# Patient Record
Sex: MALE | Race: WHITE | NOT HISPANIC OR LATINO | Employment: FULL TIME | URBAN - METROPOLITAN AREA
[De-identification: names, ages, dates, MRNs, and addresses within clinical notes are randomized per-mention and may not be internally consistent; named-entity substitution may affect disease eponyms.]

---

## 2017-02-20 ENCOUNTER — ALLSCRIPTS OFFICE VISIT (OUTPATIENT)
Dept: OTHER | Facility: OTHER | Age: 47
End: 2017-02-20

## 2017-05-19 ENCOUNTER — ALLSCRIPTS OFFICE VISIT (OUTPATIENT)
Dept: OTHER | Facility: OTHER | Age: 47
End: 2017-05-19

## 2017-06-15 ENCOUNTER — ALLSCRIPTS OFFICE VISIT (OUTPATIENT)
Dept: OTHER | Facility: OTHER | Age: 47
End: 2017-06-15

## 2017-06-15 DIAGNOSIS — E66.01 MORBID (SEVERE) OBESITY DUE TO EXCESS CALORIES (HCC): ICD-10-CM

## 2017-06-15 DIAGNOSIS — R63.5 ABNORMAL WEIGHT GAIN: ICD-10-CM

## 2017-06-23 ENCOUNTER — TRANSCRIBE ORDERS (OUTPATIENT)
Dept: ADMINISTRATIVE | Age: 47
End: 2017-06-23

## 2017-06-23 ENCOUNTER — APPOINTMENT (OUTPATIENT)
Dept: LAB | Age: 47
End: 2017-06-23
Payer: COMMERCIAL

## 2017-06-23 DIAGNOSIS — E66.01 MORBID OBESITY, UNSPECIFIED OBESITY TYPE (HCC): ICD-10-CM

## 2017-06-23 DIAGNOSIS — E66.9 OBESITY, UNSPECIFIED: ICD-10-CM

## 2017-06-23 DIAGNOSIS — R63.5 ABNORMAL WEIGHT GAIN: ICD-10-CM

## 2017-06-23 DIAGNOSIS — E66.01 MORBID OBESITY, UNSPECIFIED OBESITY TYPE (HCC): Primary | ICD-10-CM

## 2017-06-23 DIAGNOSIS — E66.9 OBESITY, UNSPECIFIED: Primary | ICD-10-CM

## 2017-06-23 DIAGNOSIS — E66.01 MORBID (SEVERE) OBESITY DUE TO EXCESS CALORIES (HCC): ICD-10-CM

## 2017-06-23 LAB
ALBUMIN SERPL BCP-MCNC: 3.6 G/DL (ref 3.5–5)
ALP SERPL-CCNC: 102 U/L (ref 46–116)
ALT SERPL W P-5'-P-CCNC: 45 U/L (ref 12–78)
ANION GAP SERPL CALCULATED.3IONS-SCNC: 7 MMOL/L (ref 4–13)
AST SERPL W P-5'-P-CCNC: 28 U/L (ref 5–45)
BILIRUB DIRECT SERPL-MCNC: 0.21 MG/DL (ref 0–0.2)
BILIRUB SERPL-MCNC: 0.61 MG/DL (ref 0.2–1)
BUN SERPL-MCNC: 16 MG/DL (ref 5–25)
CALCIUM SERPL-MCNC: 9.3 MG/DL (ref 8.3–10.1)
CHLORIDE SERPL-SCNC: 106 MMOL/L (ref 100–108)
CHOLEST SERPL-MCNC: 188 MG/DL (ref 50–200)
CO2 SERPL-SCNC: 27 MMOL/L (ref 21–32)
CREAT SERPL-MCNC: 0.92 MG/DL (ref 0.6–1.3)
GFR SERPL CREATININE-BSD FRML MDRD: >60 ML/MIN/1.73SQ M
GLUCOSE P FAST SERPL-MCNC: 74 MG/DL (ref 65–99)
HDLC SERPL-MCNC: 62 MG/DL (ref 40–60)
INSULIN SERPL-ACNC: 14.4 MU/L (ref 3–25)
LDLC SERPL CALC-MCNC: 112 MG/DL (ref 0–100)
POTASSIUM SERPL-SCNC: 4.7 MMOL/L (ref 3.5–5.3)
PROT SERPL-MCNC: 7.3 G/DL (ref 6.4–8.2)
SODIUM SERPL-SCNC: 140 MMOL/L (ref 136–145)
T4 FREE SERPL-MCNC: 1.09 NG/DL (ref 0.76–1.46)
TRIGL SERPL-MCNC: 68 MG/DL
TSH SERPL DL<=0.05 MIU/L-ACNC: 4.17 UIU/ML (ref 0.36–3.74)

## 2017-06-23 PROCEDURE — 80048 BASIC METABOLIC PNL TOTAL CA: CPT

## 2017-06-23 PROCEDURE — 80076 HEPATIC FUNCTION PANEL: CPT

## 2017-06-23 PROCEDURE — 80061 LIPID PANEL: CPT

## 2017-06-23 PROCEDURE — 36415 COLL VENOUS BLD VENIPUNCTURE: CPT

## 2017-06-23 PROCEDURE — 83525 ASSAY OF INSULIN: CPT

## 2017-06-23 PROCEDURE — 84439 ASSAY OF FREE THYROXINE: CPT

## 2017-06-23 PROCEDURE — 84443 ASSAY THYROID STIM HORMONE: CPT

## 2017-06-26 ENCOUNTER — GENERIC CONVERSION - ENCOUNTER (OUTPATIENT)
Dept: OTHER | Facility: OTHER | Age: 47
End: 2017-06-26

## 2017-08-04 ENCOUNTER — OFFICE VISIT (OUTPATIENT)
Dept: LAB | Facility: HOSPITAL | Age: 47
End: 2017-08-04
Attending: INTERNAL MEDICINE
Payer: COMMERCIAL

## 2017-08-04 ENCOUNTER — TRANSCRIBE ORDERS (OUTPATIENT)
Dept: LAB | Facility: HOSPITAL | Age: 47
End: 2017-08-04

## 2017-08-04 ENCOUNTER — ALLSCRIPTS OFFICE VISIT (OUTPATIENT)
Dept: OTHER | Facility: OTHER | Age: 47
End: 2017-08-04

## 2017-08-04 DIAGNOSIS — E66.1 DRUG-INDUCED OBESITY, UNSPECIFIED OBESITY SEVERITY: ICD-10-CM

## 2017-08-04 DIAGNOSIS — R63.5 ABNORMAL WEIGHT GAIN: ICD-10-CM

## 2017-08-04 DIAGNOSIS — R63.5 ABNORMAL WEIGHT GAIN: Primary | ICD-10-CM

## 2017-08-04 PROCEDURE — 93005 ELECTROCARDIOGRAM TRACING: CPT

## 2017-08-05 LAB
ATRIAL RATE: 68 BPM
PR INTERVAL: 162 MS
QRS AXIS: 68 DEGREES
QRSD INTERVAL: 80 MS
QT INTERVAL: 400 MS
QTC INTERVAL: 425 MS
T WAVE AXIS: 131 DEGREES
VENTRICULAR RATE: 68 BPM

## 2017-08-09 ENCOUNTER — GENERIC CONVERSION - ENCOUNTER (OUTPATIENT)
Dept: OTHER | Facility: OTHER | Age: 47
End: 2017-08-09

## 2017-08-09 DIAGNOSIS — R94.31 ABNORMAL ELECTROCARDIOGRAM: ICD-10-CM

## 2017-08-10 ENCOUNTER — TRANSCRIBE ORDERS (OUTPATIENT)
Dept: ADMINISTRATIVE | Facility: HOSPITAL | Age: 47
End: 2017-08-10

## 2017-08-10 DIAGNOSIS — R94.31 ABNORMAL ELECTROCARDIOGRAM: Primary | ICD-10-CM

## 2017-08-18 ENCOUNTER — HOSPITAL ENCOUNTER (OUTPATIENT)
Dept: NON INVASIVE DIAGNOSTICS | Facility: CLINIC | Age: 47
Discharge: HOME | End: 2017-08-18
Payer: COMMERCIAL

## 2017-08-18 DIAGNOSIS — R94.31 ABNORMAL ELECTROCARDIOGRAM: ICD-10-CM

## 2017-08-18 PROCEDURE — 93306 TTE W/DOPPLER COMPLETE: CPT

## 2017-09-11 ENCOUNTER — ALLSCRIPTS OFFICE VISIT (OUTPATIENT)
Dept: OTHER | Facility: OTHER | Age: 47
End: 2017-09-11

## 2017-10-12 ENCOUNTER — ALLSCRIPTS OFFICE VISIT (OUTPATIENT)
Dept: OTHER | Facility: OTHER | Age: 47
End: 2017-10-12

## 2017-10-12 DIAGNOSIS — E03.9 HYPOTHYROIDISM: ICD-10-CM

## 2017-10-26 ENCOUNTER — GENERIC CONVERSION - ENCOUNTER (OUTPATIENT)
Dept: OTHER | Facility: OTHER | Age: 47
End: 2017-10-26

## 2017-11-18 ENCOUNTER — TRANSCRIBE ORDERS (OUTPATIENT)
Dept: ADMINISTRATIVE | Age: 47
End: 2017-11-18

## 2017-11-18 ENCOUNTER — APPOINTMENT (OUTPATIENT)
Dept: LAB | Age: 47
End: 2017-11-18
Payer: COMMERCIAL

## 2017-11-18 DIAGNOSIS — E03.9 HYPOTHYROIDISM: ICD-10-CM

## 2017-11-18 DIAGNOSIS — L40.0 PSORIASIS VULGARIS: Primary | ICD-10-CM

## 2017-11-18 DIAGNOSIS — L40.0 PSORIASIS VULGARIS: ICD-10-CM

## 2017-11-18 LAB — TSH SERPL DL<=0.05 MIU/L-ACNC: 3.32 UIU/ML (ref 0.36–3.74)

## 2017-11-18 PROCEDURE — 84443 ASSAY THYROID STIM HORMONE: CPT

## 2017-11-18 PROCEDURE — 86063 ANTISTREPTOLYSIN O SCREEN: CPT

## 2017-11-18 PROCEDURE — 36415 COLL VENOUS BLD VENIPUNCTURE: CPT

## 2017-11-20 LAB — ASO AB TITR SER LA: NORMAL {TITER}

## 2017-11-22 ENCOUNTER — ALLSCRIPTS OFFICE VISIT (OUTPATIENT)
Dept: OTHER | Facility: OTHER | Age: 47
End: 2017-11-22

## 2017-12-19 ENCOUNTER — GENERIC CONVERSION - ENCOUNTER (OUTPATIENT)
Dept: OTHER | Facility: OTHER | Age: 47
End: 2017-12-19

## 2018-01-10 NOTE — PROGRESS NOTES
Chief Complaint  Chief Complaint Free Text Note Form: [facility] follow up      History of Present Illness  HPI: The patient presents for [facility] follow up  Has been taking the Vyvanse without side effects  Has no impulse to binge while at work, so has been taking the Vyvanse as late as 4 pm, except for on the weekends, where he takes it earlier in the day  He denies trouble sleeping, but feels the effects where off by 9 30 pm  He has seen Brenda Cody, the eating disorder counselor, twice since his last visit  He is interested in starting a medication like an SSRI or Wellbutrin, for which he will f/u with his PCP to discuss  He exercises on the treadmill daily  Past Medical History    1  History of Elevated blood pressure reading (796 2) (R03 0)  Active Problems And Past Medical History Reviewed: The active problems and past medical history were reviewed and updated today  Assessment    1  Abnormal weight gain (783 1) (R63 5)   2  Obesity (278 00) (E66 9)   3  Binge eating disorder (307 50) (F50 81)   4  Subclinical hypothyroidism (244 8) (E03 9)    Plan  Binge eating disorder    1  Vyvanse 30 MG Oral Capsule; TAKE 1 CAPSULE DAILY IN THE MORNING  Subclinical hypothyroidism    2  (1) TSH WITH FT4 REFLEX; Status:Active; Requested for:12Oct2017;     Discussion/Summary  Discussion Summary:   52-year-old male with obesity  He has had successful weight loss with [employer], but had concerns of binge eating disorder  He was evaluated by behavioral health specialist, Brenda Cody and was diagnosed with binge eating disorder  At his last visit, treatment options were reviewed: Vyvanse (treat the binging) vs SSRI, which would be used to treat the trigger to the binge eating disorder  He was not interested in starting an SSRI at that time  Currently, his sx are improving with the Vyvanse, but it wears off by the end of the day  He will continue taking the Vyvanse in the early afternoon   Discussed increasing the dose to 50 milligrams, however since he is binging now only 2 times per month (as compared to 3-4x/week) he prefers to stay at the 30 milligram dose as he is doing well without side effects  At this time, hewould like to consider adding a weight neutral SSRI as he agrees this may aid in treating the root cause to the binge  He will follow up with his PCP to discuss as well as discussing transition of management of the Vyvanse with them  Initial: 259 3 lbs  Current: 233 3 lbs  Change: -26 lbs (-10 %)    Obesity Class 2:  -he has been doing well with weight loss by following [employer] program, despite his frequent travel    -Not currently interested in the weight loss programs offered by [facility]  -Pt will follow up with Pat Young for continued co-management and tx with Vyvanse  BED:  -sees Pat Young bi-weekly  Per conversation from September visit: She will continue seeing the pt, but adds the pt does lack insight to the emotions/triggers and she will continue with management on the issue  I had reviewed with her the tx options we had discussed and that pt had opted for starting the Vyvanse over the SSRI  -left message for Pat Young to return call on patient progress  At today's visit patient seemed to have more insight and expressed interest that he does not want to rely on a medication for treatment of the bingeing  He would like to trial a medication like an SSRI in lieu of increasing the Vyvanse dose  -EKG: low voltage QRS, therefore ECHO was ordered, which showed trace mitral and tricuspid regurgitation, otherwise was in acceptable limits  Will continue the Vyvanse  Script given today  Not to fill before 9/17/17     Subclinical hypothyroidism:  -Tx not indicated at this time  Repeat in 3-6 months  -TSH with reflex to T4 ordered  RTC in one month, however he is traveling in the 1st two weeks of November so will follow up at the end of November   Vyvanse prescription for November provided to the patient, with the understanding that he would be seen during the last week of November  Goals and Barriers: The patient has the current Goals: At resistance exercises 2 times per week  Minimum 64 ounces of water per day  Patient's Capacity to Self-Care: Patient is able to Self-Care  Medication SE Review and Pt Understands Tx: Possible side effects of new medications were reviewed with the patient/guardian today  The treatment plan was reviewed with the patient/guardian  The patient/guardian understands and agrees with the treatment plan   Bariatric Medicine Diagnostic Constellation: The patient was seen for a 25 minute visit  > 50 % of the time was spent counseling  PT was counseled on the multi-disciplinary approach to weight loss  Discussed appropriate use of Vyvanse and regular follow up in order for continued use of the medication  Reviewed the importance of a multidisciplinary approach to weight loss, which includes dietary, behavioral, and exercise modifications  Patient Discussion: 25 minute visit, greater than half of the time was spent on counseling  Counseling spent on review of medication options and side effect profiles  Emphasize the importance of behavioral health follow-up as well as close follow-up with our office in order for us to continue prescribing Vyvanse  Reviewed lab work with patient   Understands and agrees with treatment plan: The treatment plan was reviewed with the patient/guardian  The patient/guardian understands and agrees with the treatment plan      Active Problems    1  Abnormal EKG (794 31) (R94 31)   2  Abnormal weight gain (783 1) (R63 5)   3  Binge eating disorder (307 50) (F50 81)   4  Obesity (278 00) (E66 9)   5  Psoriasis (696 1) (L40 9)   6  Severe obesity (BMI 35 0-39 9) (278 01) (E66 01)   7  Subclinical hypothyroidism (244 8) (E03 9)    Surgical History    1  Denied: History of Recent Surgery  Surgical History Reviewed:    The surgical history was reviewed and updated today  Family History  Mother    1  Family history of psoriasis (V19 4) (Z84 0)  Father    2  Family history of hypertension (V17 49) (Z82 49)  Son    3  Family history of ADHD (attention deficit hyperactivity disorder) evaluation  Spouse    4  Family history of attention deficit disorder (V17 0) (Z81 8)  Family History Reviewed: The family history was reviewed and updated today  Social History    · Alcohol drinker (V49 89) (Z78 9)   · Always uses seat belt   · Employed in professional specialty position   ·    · Never a smoker   · No illicit drug use  Social History Reviewed: The social history was reviewed and updated today  Current Meds   1  Calcipotriene 0 005 % External Solution; APPLY SPARINGLY TO AFFECTED AREA(S)   ONCE DAILY; Therapy: 76Qwi2664 to (Last Rx:33Cne5732)  Requested for: 61Kje8079 Ordered   2  Clobetasol Propionate 0 05 % External Ointment; APPLY AND GENTLY MASSAGE INTO   AFFECTED AREA(S) TWICE DAILY; Therapy: 78Kvp7361 to (Last Rx:81Iqe2473)  Requested for: 06GZK6021 Ordered   3  Daily Multivitamin TABS; Therapy: (Recorded:15Jun2017) to Recorded   4  Vyvanse 30 MG Oral Capsule; TAKE 1 CAPSULE DAILY IN THE MORNING; Therapy: 50HBI4068 to (Evaluate:11Oct2017); Last Rx:48Nrt1291 Ordered  Medication List Reviewed: The medication list was reviewed and updated today  Allergies    1  No Known Drug Allergies    Vitals  Vital Signs    Recorded: 43HCO8886 03:34PM   Temperature 97 1 F   Heart Rate 84   Respiration 16   Systolic 937   Diastolic 78   Height 5 ft 8 5 in   Weight 233 lb 4 8 oz   BMI Calculated 34 96   BSA Calculated 2 19     Physical Exam    Constitutional   General appearance: Abnormal   well developed and obese  Eyes No conjunctival pallor  Ears, Nose, Mouth, and Throat Oral mucosa moist    Pulmonary   Respiratory effort: No increased work of breathing or signs of respiratory distress      Auscultation of lungs: Clear to auscultation, equal breath sounds bilaterally, no wheezes, no rales, no rhonci  Cardiovascular   Auscultation of heart: Normal rate and rhythm, normal S1 and S2, without murmurs  Examination of extremities for edema and/or varicosities: Normal   no edema  Abdomen   Abdomen: Abnormal   The abdomen was obese  Bowel sounds were normal  The abdomen was soft and nontender     Musculoskeletal   Gait and station: Normal     Psychiatric   Orientation to person, place and time: Normal          Future Appointments    Date/Time Provider Specialty Site   11/22/2017 08:00 AM Mikhail Martinez [facility] Bariatric Medicine [facility]     Signatures   Electronically signed by : Saintclair Savage, [facility]; Oct 12 2017  4:33PM EST                       (Author)    Electronically signed by : TU Clemens ; Oct 13 2017  8:35AM EST                       (Co-author)

## 2018-01-11 ENCOUNTER — GENERIC CONVERSION - ENCOUNTER (OUTPATIENT)
Dept: OTHER | Facility: OTHER | Age: 48
End: 2018-01-11

## 2018-01-13 VITALS
WEIGHT: 233.3 LBS | BODY MASS INDEX: 34.55 KG/M2 | TEMPERATURE: 97.1 F | RESPIRATION RATE: 16 BRPM | DIASTOLIC BLOOD PRESSURE: 78 MMHG | SYSTOLIC BLOOD PRESSURE: 124 MMHG | HEIGHT: 69 IN | HEART RATE: 84 BPM

## 2018-01-13 VITALS
WEIGHT: 298.38 LBS | BODY MASS INDEX: 45.22 KG/M2 | HEIGHT: 68 IN | SYSTOLIC BLOOD PRESSURE: 130 MMHG | RESPIRATION RATE: 18 BRPM | TEMPERATURE: 97.9 F | DIASTOLIC BLOOD PRESSURE: 80 MMHG | HEART RATE: 84 BPM

## 2018-01-13 NOTE — PROGRESS NOTES
Chief Complaint  Chief Complaint Free Text Note Form: Patient is here today for 2-3 month [facility] follow-up  Past Medical History    1  History of Elevated blood pressure reading (796 2) (R03 0)    Assessment    1  Severe obesity (BMI 35 0-39 9) (278 01) (E66 01)   2  Binge eating disorder (307 50) (F50 81)   3  Subclinical hypothyroidism (244 8) (E03 9)    Plan  Abnormal weight gain, Severe obesity (BMI 35 0-39 9)    1  ECG 12-LEAD; Status:Hold For - Scheduling; Requested for:49Gxc1739;     Discussion/Summary  Discussion Summary:   15-year-old male with severe obesity stemming since childhood  Maximum weight having been 400 pounds and successfully lost down to 180 pounds through diet and exercise modification but this was not maintained for more than 1 year and progressively regained close to 400 pound huang again  Over the past 4 years or so he has slowly come down through dietary changes and recently started [facility] but was concerned over certain eating behaviors that he has taken notice of  He is concerned that he may have a binge eating disorder and would like to be placed on Vyvnanse  He certainly has characteristics of BED and is currently seeking behavioral health help from a specialist that specializes in eating disorders (Latanya Tirado)  Obesity Class 2:  -continues to do well w/ weight loss, following a [facility] program, did not start VLCD and he is not interested in any of our other programs  -his main concern is to get treatment for his BED, I again emphasized that we are not an eating disorder clinic however since he has agreed to follow up w/ behavioral health I will be happy to co-manage this condition from a medical standpoint  Should he stop following up w/ behavioral health or our practice I will no longer manage this condition  Pt understands    BED:  -sees Latanya Tirado weekly  I spoke to her yesterday  Some of these triggers appear to be related to family stressors   I addressed the importance of tackling the root cause of this and suggested an SSRI such as celexa or lexapro may be more appropriate since there is an emotional trigger(anxiety) that causes him to binge rather than Vyvanse which only tackles the act of binging  He was quite insistent on starting vyvanse, I will order an EKG, denies CP,SOB, CAD, fam leydi of SCD  Pt reports a normal stress test in 2012  Side effect profile reviewed  Subclinical hypothyroidism:  -No indication for treatment this time  Repeat in 3-6 months  RTC in 4-6 weeks  Patient's Capacity to Self-Care: Patient is able to Self-Care  Medication SE Review and Pt Understands Tx: Possible side effects of new medications were reviewed with the patient/guardian today  The treatment plan was reviewed with the patient/guardian  The patient/guardian understands and agrees with the treatment plan   Bariatric Medicine Diagnostic Constellation:   Patient Discussion: 40 minute visit, greater than half of the time was spent on counseling  Counseling spent on review of medication options and side effect profiles  Emphasize the importance of behavioral health follow-up as well as close follow-up with our office in order for us to continue prescribing Vyvanse  Reviewed lab work with patient   Understands and agrees with treatment plan: The treatment plan was reviewed with the patient/guardian  The patient/guardian understands and agrees with the treatment plan      Active Problems    1  Abnormal weight gain (783 1) (R63 5)   2  Obesity (278 00) (E66 9)   3  Psoriasis (696 1) (L40 9)   4  Severe obesity (BMI 35 0-39 9) (278 01) (E66 01)    Surgical History    1  Denied: History of Recent Surgery    Family History  Mother    1  Family history of psoriasis (V19 4) (Z84 0)  Father    2  Family history of hypertension (V17 49) (Z82 49)  Son    3  Family history of ADHD (attention deficit hyperactivity disorder) evaluation  Spouse    4   Family history of attention deficit disorder (V17 0) (Z81 8)    Social History    · Alcohol drinker (V49 89) (Z78 9)   · Always uses seat belt   · Employed in professional specialty position   ·    · Never a smoker   · No illicit drug use    Current Meds   1  Calcipotriene 0 005 % External Solution; APPLY SPARINGLY TO AFFECTED AREA(S)   ONCE DAILY; Therapy: 19Jyw1093 to (Last Rx:03Sgk1506)  Requested for: 70Ezi0780 Ordered   2  Clobetasol Propionate 0 05 % External Ointment; APPLY AND GENTLY MASSAGE INTO   AFFECTED AREA(S) TWICE DAILY; Therapy: 91Cuf7232 to (Last Rx:19May2017)  Requested for: 81RLP1308 Ordered   3  Daily Multivitamin TABS; Therapy: (Recorded:15Jun2017) to Recorded    Allergies    1  No Known Drug Allergies    Vitals  Vital Signs    Recorded: 04Aug2017 09:30AM   Temperature 98 1 F, Tympanic   Heart Rate 86, L Radial   Pulse Quality Normal, L Radial   Systolic 531, LUE, Sitting   Diastolic 72, LUE, Sitting   Height 5 ft 8 5 in   Weight 243 lb 3 2 oz   BMI Calculated 36 44   BSA Calculated 2 23     Future Appointments    Date/Time Provider Specialty Site   09/11/2017 06:00 PM TU Catherine   [address] [place] [address]   09/11/2017 03:00 PM Kimani Hamilton [facility] Diabetes Educator [facility]     Signatures   Electronically signed by : TU Baeza ; Aug  4 2017 10:28AM EST                       (Author)

## 2018-01-14 VITALS
HEIGHT: 69 IN | HEART RATE: 78 BPM | DIASTOLIC BLOOD PRESSURE: 92 MMHG | WEIGHT: 241.9 LBS | TEMPERATURE: 97.6 F | BODY MASS INDEX: 35.83 KG/M2 | SYSTOLIC BLOOD PRESSURE: 132 MMHG

## 2018-01-14 VITALS
SYSTOLIC BLOOD PRESSURE: 132 MMHG | TEMPERATURE: 98.1 F | DIASTOLIC BLOOD PRESSURE: 72 MMHG | HEART RATE: 86 BPM | HEIGHT: 69 IN | BODY MASS INDEX: 36.02 KG/M2 | WEIGHT: 243.2 LBS

## 2018-01-14 VITALS
RESPIRATION RATE: 16 BRPM | BODY MASS INDEX: 41.75 KG/M2 | TEMPERATURE: 98.6 F | SYSTOLIC BLOOD PRESSURE: 122 MMHG | DIASTOLIC BLOOD PRESSURE: 80 MMHG | WEIGHT: 266 LBS | HEIGHT: 67 IN | HEART RATE: 72 BPM

## 2018-01-14 NOTE — PROGRESS NOTES
Chief Complaint  Chief Complaint Free Text Note Form: Patient is here today for 4-6 week [facility] follow-up  History of Present Illness  HPI: The patient presents for six-week medical weight management follow-up  Since his last visit he was started on Wellbutrin by his PCP  He is tolerating the medication well without side effects  He has had 1-2 binges in the past month, despite these episodes, he does feel like it is effective as in the past was experiencing bingeing episodes between 2-4 times per week  At times does not remain effective throughout the entire day, particularly on the weekends  At this time he would like to try increasing the dose to 50 mg as discussed at his last appointment, which is the recommended dose for BED  Reports he does feel that the Wellbutrin is helping some and feels that he does notice subtle effects throughout the entire day  He describes this as things feeling brighter  He was traveling frequently in the past month, but plans to continue seeing the eating disorder specialist, Benjamin Haro  He will follow up with his PCP next month for continued medication management  Past Medical History    1  History of Elevated blood pressure reading (796 2) (R03 0)   2  History of Severe obesity (BMI 35 0-39 9) (278 01) (E66 01)   3  History of Subclinical hypothyroidism (244 8) (E03 9)  Active Problems And Past Medical History Reviewed: The active problems and past medical history were reviewed and updated today  Assessment    1  History of Severe obesity (BMI 35 0-39 9) (278 01) (E66 01)   2  Binge eating disorder (307 50) (F50 81)   3  Class 1 obesity (278 00) (E66 9)   4  History of Subclinical hypothyroidism (244 8) (E03 9)    Plan  Binge eating disorder    1  From  Vyvanse 30 MG Oral Capsule TAKE 1 CAPSULE DAILY IN THE   MORNING To Vyvanse 50 MG Oral Capsule TAKE 1 CAPSULE DAILY IN THE MORNING    Discussion/Summary  Discussion Summary:   26-year-old male with obesity   He has had successful weight loss with [employer], but had concerns of binge eating disorder  He was evaluated by behavioral health specialist, Benjamin Haro and was diagnosed with binge eating disorder  Since his last visit, he was started on Wellbutrin, which has helped underlying anxiety  He continues to do well with Vyvanse  We will titrate him up to the 50 mg dose  Initial: 259 3 lbs  Current: 222 6 lbs  Change: -36 7 lbs (-14 %)    Obesity Class 2:  -he has been doing well with weight loss by following [employer] program, despite his frequent travel    -Not currently interested in the weight loss programs offered by [facility]  -Pt will follow up with Benjamin Haro for continued co-management and tx with Vyvanse  BED/anxiety:  -sees Benjamin Haro bi-weekly  Per conversation from September visit: She will continue seeing the pt, but adds the pt does lack insight to the emotions/triggers and she will continue with management on the issue  I had reviewed with her the tx options we had discussed and that pt had opted for starting the Vyvanse over the SSRI  -spoke with Benjamin Haro on 11/21  She plans on continuing to see Alona Kerr  He will return to see her as his work travel slow down  -EKG: low voltage QRS, therefore ECHO was ordered, which showed trace mitral and tricuspid regurgitation, otherwise was in acceptable limits  -PDMP queried, no red flags  Patient will be increased to 50 mg of the Vyvanse  He will start this on Monday  He has follow-up with his PCP scheduled for December to review medication management of Wellbutrin  If he is tolerating the dose increase of the Vyvanse at that time without any adverse effects, he will have management of BED by PCP  Subclinical hypothyroidism:  -TSH within acceptable limits from 11/18  -recommended he continue follow up with his PCP and have the TSH recheck in one year, unless he develops S/Sx of hyper or hypothyroidism as reviewed with the patient      RTC in one month to review med dose increased, unless well tolerated at his appointment with his PCP in December, in which prescription management will be taken over by them at his PCP's discretion  Patient's Capacity to Self-Care: Patient is able to Self-Care  Medication SE Review and Pt Understands Tx: Possible side effects of new medications were reviewed with the patient/guardian today  The treatment plan was reviewed with the patient/guardian  The patient/guardian understands and agrees with the treatment plan   Bariatric Medicine Diagnostic Constellation: The patient was seen for a 25 minute visit  > 50 % of the time was spent counseling  PT was counseled on the multi-disciplinary approach to weight loss  Discussed appropriate use of Vyvanse and regular follow up in order for continued use of the medication  Reviewed the importance of a multidisciplinary approach to weight loss, which includes dietary, behavioral, and exercise modifications  Understands and agrees with treatment plan: The treatment plan was reviewed with the patient/guardian  The patient/guardian understands and agrees with the treatment plan      Active Problems    1  Abnormal EKG (794 31) (R94 31)   2  Abnormal weight gain (783 1) (R63 5)   3  Binge eating disorder (307 50) (F50 81)   4  Influenza vaccine needed (V04 81) (Z23)   5  Obesity (278 00) (E66 9)   6  Psoriasis (696 1) (L40 9)    Surgical History    1  Denied: History of Recent Surgery  Surgical History Reviewed: The surgical history was reviewed and updated today  Family History  Mother    1  Family history of psoriasis (V19 4) (Z84 0)  Father    2  Family history of hypertension (V17 49) (Z82 49)  Son    3  Family history of ADHD (attention deficit hyperactivity disorder) evaluation  Spouse    4  Family history of attention deficit disorder (V17 0) (Z81 8)  Family History Reviewed: The family history was reviewed and updated today         Social History    · Alcohol drinker (V49 89) (Z78 9)   · Always uses seat belt   · Employed in professional specialty position   ·    · Never a smoker   · No illicit drug use  Social History Reviewed: The social history was reviewed and updated today  Current Meds   1  Calcipotriene 0 005 % External Solution; APPLY SPARINGLY TO AFFECTED AREA(S)   ONCE DAILY; Therapy: 03Bef9919 to (Last Rx:67Dnp9743)  Requested for: 00Lbf8311 Ordered   2  Clobetasol Propionate 0 05 % External Ointment; APPLY AND GENTLY MASSAGE INTO   AFFECTED AREA(S) TWICE DAILY; Therapy: 07Iew6195 to (Last Rx:26Oct2017)  Requested for: 26Oct2017 Ordered   3  Daily Multivitamin TABS; Therapy: (Recorded:15Jun2017) to Recorded   4  Vyvanse 30 MG Oral Capsule; TAKE 1 CAPSULE DAILY IN THE MORNING; Therapy: 93NJR5572 to (Evaluate:11Nov2017); Last Rx:12Oct2017 Ordered   5  Wellbutrin  MG Oral Tablet Extended Release 12 Hour; TAKE 1 TABLET TWICE   DAILY; Therapy: 22WBB0229 to 52-28-62-17)  Requested for: 0499 52 06 34; Last   Rx:26Oct2017 Ordered  Medication List Reviewed: The medication list was reviewed and updated today  Allergies    1  No Known Drug Allergies    Vitals  Vital Signs    Recorded: 22Nov2017 08:11AM Recorded: 22Nov2017 07:56AM   Temperature  98 4 F   Heart Rate  80   Systolic 693 783, LUE, Sitting   Diastolic 88 90, LUE, Sitting   Height  5 ft 8 5 in   Weight  222 lb 9 6 oz   BMI Calculated  33 35   BSA Calculated  2 15     Physical Exam    Constitutional   General appearance: Abnormal   well developed and obese  Eyes No conjunctival pallor  Ears, Nose, Mouth, and Throat Oral mucosa moist    Pulmonary   Respiratory effort: No increased work of breathing or signs of respiratory distress  Auscultation of lungs: Clear to auscultation, equal breath sounds bilaterally, no wheezes, no rales, no rhonci  Cardiovascular   Auscultation of heart: Normal rate and rhythm, normal S1 and S2, without murmurs      Examination of extremities for edema and/or varicosities: Normal   no edema  Abdomen   Abdomen: Abnormal   The abdomen was obese  Bowel sounds were normal  The abdomen was soft and nontender  Musculoskeletal   Gait and station: Normal     Psychiatric   Orientation to person, place and time: Normal          Results/Data  (1) TSH WITH FT4 REFLEX 16HMO9178 07:28AM Jhonatan Connors   TW Order Number: [id number]     Test Name Result Flag Reference   TSH 3 320 uIU/mL  0 358-3 740   Patients undergoing fluorescein dye angiography may retain small amounts of fluorescein in the body for 48-72 hours post procedure  Samples containing fluorescein can produce falsely depressed TSH values  If the patient had this procedure,a specimen should be resubmitted post fluorescein clearance  Future Appointments    Date/Time Provider Specialty Site   12/05/2017 03:20 PM TU Del Cid  [address] [place] [address]   12/27/2017 08:30 AM Jhonatan Connors [facility] Bariatric Medicine [facility]     Verified Results  (1) TSH WITH FT4 REFLEX 67HXO3206 07:28AM Jhonatan Connors    Order Number: [id number]   [Nov 22, 2017 8:09AM Genna Johnson]  TSH within acceptable limits  Reviewed with the pt at his appointment on 11/22/17  Recommend annual f/u with PCP     Test Name Result Flag Reference   TSH 3 320 uIU/mL  0 358-3 740   Patients undergoing fluorescein dye angiography may retain small amounts of fluorescein in the body for 48-72 hours post procedure  Samples containing fluorescein can produce falsely depressed TSH values  If the patient had this procedure,a specimen should be resubmitted post fluorescein clearance         Signatures   Electronically signed by : Kurtis yMles [facility]; Nov 22 2017  8:34AM EST                       (Author)    Electronically signed by : TU Gonzalez ; Nov 27 2017  8:02AM EST                       (Co-author)

## 2018-01-15 NOTE — RESULT NOTES
Verified Results  ECG 12-LEAD [id number] 10:46AM Judeen Maker     Test Name Result Flag Reference   ECG 12-LEAD      Normal sinus rhythm   Low voltage QRS   Abnormal ECG   No previous ECGs available   Confirmed by Dayna Arguello ([id number]) on 8/5/2017 6:48:05 AM

## 2018-01-15 NOTE — CONSULTS
Chief Complaint  Chief Complaint Free Text Note Form: Patient is here today for MWM Consultation  Stop Julieth Ka: 3/8      Past Medical History    1  History of Elevated blood pressure reading (796 2) (R03 0)  Active Problems And Past Medical History Reviewed: The active problems and past medical history were reviewed and updated today  Assessment    1  Severe obesity (BMI 35 0-39 9) (278 01) (E66 01)   2  Abnormal weight gain (783 1) (R63 5)    Plan   1  (1) HEPATIC FUNCTION PANEL; Status:Active; Requested EOH:[id number];    2  (1) INSULIN, FASTING; Status:Active; Requested OAM:07YIT3957;    3  (1) TSH WITH FT4 REFLEX; Status:Active; Requested QJY:[id number];     Discussion/Summary  Discussion Summary:   60-year-old male with severe obesity stemming since childhood  Maximum weight having been 400 pounds and successfully lost down to 180 pounds through diet and exercise modification but this was not maintained for more than 1 year and progressively regained close to 400 pound huang again  Over the past 4 years or so he has slowly come down through dietary changes and recently started [facility] but was concerned over certain eating behaviors that he has taken notice of  He is concerned that he may have a binge eating disorder and would like to be placed on Vyvnanse  He certainly has characteristics of BED, but am not entirely convinced it is this as he tells me he never feels full when he eats  I propose that he may be relying on his stretch response from his stomach 2 trigger to stop eating rather than allowing normal physiologic mechanisms controlled through the hypothalamus 2 signal to stop eating  I encouraged him to meet with a behavioral health specialist that specializes in eating disorders (Cindy Alvarado) to determine whether or not indeed he has a diagnosis of binge eating   When he returns from his vacation at the beginning of July he will start a very low calorie diet for 2 weeks to re-sensitize his stomach to smaller portions to see if this will help him with his concerned behavior  I will reevaluate him in the coming months to see if we have been able to overcome this behavior through dietary and behavioral modification as well as other lifestyle changes  If indeed he has been diagnosed with a binge eating disorder and continues to seek out therapy for this we can potentially consider medications for this including vyvanse, topiramate or and SSRI among anti-obesity medications  I have ordered some additional labs to complete his metabolic workup and we will reassess in 3 months  Patient's Capacity to Self-Care: Patient is able to Self-Care  Medication SE Review and Pt Understands Tx: Possible side effects of new medications were reviewed with the patient/guardian today  Bariatric Medicine Diagnostic Constellation:   Patient Discussion: 45 minute visit, greater than half of the time was spent on counseling  Counseling spent on the importance of behavioral health therapy with regards to potential of binge eating disorder  Discussed role of pharmacotherapy in binge eating disorder as well as and obesity treatment  Counseled patient on dietary behavioral and excised modification for weight loss  Understands and agrees with treatment plan: The treatment plan was reviewed with the patient/guardian  The patient/guardian understands and agrees with the treatment plan   Self Referrals:   Self Referrals: No      Active Problems    1  Obesity (278 00) (E66 9)   2  Psoriasis (696 1) (L40 9)    Surgical History  Surgical History Reviewed: The surgical history was reviewed and updated today  Family History  Mother    1  Family history of psoriasis (V19 4) (Z84 0)  Father    2  Family history of hypertension (V17 49) (Z82 49)  Son    3  Family history of ADHD (attention deficit hyperactivity disorder) evaluation  Spouse    4  Family history of attention deficit disorder (V17 0) (Z81 8)  Family History Reviewed:    The family history was reviewed and updated today  Social History    · Alcohol drinker (V49 89) (Z78 9)   · Always uses seat belt   · Employed in professional specialty position   ·    · Never a smoker   · No illicit drug use  Social History Reviewed: The social history was reviewed and updated today  Current Meds   1  Calcipotriene 0 005 % External Solution; APPLY SPARINGLY TO AFFECTED AREA(S)   ONCE DAILY; Therapy: 58Oor7443 to (Last Rx:21Fnw8441)  Requested for: 79Jxq7520 Ordered   2  Clobetasol Propionate 0 05 % External Ointment; APPLY AND GENTLY MASSAGE INTO   AFFECTED AREA(S) TWICE DAILY; Therapy: 58Xof0971 to (Last Rx:49Jeu6517)  Requested for: 85EFZ0172 Ordered   3  Daily Multivitamin TABS; Therapy: (Recorded:15Jun2017) to Recorded  Medication List Reviewed: The medication list was reviewed and updated today  Allergies    1  No Known Drug Allergies    Vitals  Vital Signs    Recorded: 95CSK8420 01:36PM   Temperature 97 8 F, Tympanic   Heart Rate 76, L Radial   Pulse Quality Normal, L Radial   Systolic 712, LUE, Sitting   Diastolic 72, LUE, Sitting   BP CUFF SIZE Large   Height 5 ft 8 5 in   Weight 259 lb 3 oz   BMI Calculated 38 84   BSA Calculated 2 29   Waist Circumference 52 in  Neck Circumference 15 in  Future Appointments    Date/Time Provider Specialty Site   07/03/2017 03:00 PM [address]   09/11/2017 06:00 PM TU Smith  [address] [place] [address]   08/04/2017 09:30 AM TU Hernandez   Bariatric Medicine [facility]     Signatures   Electronically signed by : TU Miranda ; Tito 15 2017  4:57PM EST                       (Author)

## 2018-01-16 NOTE — RESULT NOTES
Verified Results  ECHO COMPLETE WITH CONTRAST IF INDICATED [id number] 07:43AM Jessica REED Order Number: [id number]    - Patient Instructions: To schedule this appointment, please contact Central Scheduling at [phone]  Called for prior auth- no auth needed for this test      Test Name Result Flag Reference   ECHO COMPLETE WITH CONTRAST IF INDICATED (Report)     [address]   [facility], [address] [facility]   [phone]     Transthoracic Echocardiogram   2D, M-mode, Doppler, and Color Doppler     Study date: 18-Aug-2017     Patient: Danya Forde   MR number: [id number]   Account number: [de-identified]   : 1970   Age: 56 years   Gender: Male   Status: Outpatient   Location: [address] [place] [address] [facility] [place]   Height: 68 in   Weight: 243 lb   BP: 132/ 73 mmHg     Indications: Abnormal EKG     Diagnoses: R94 31 - Abnormal electrocardiogram [ECG] [EKG]     Sonographer: ABBY Medina   Referring Physician: Sanna Angel MD   Group: [facility]   Interpreting Physician: Shamika Vázquez MD     SUMMARY     LEFT VENTRICLE:   Size was normal    Systolic function was normal  Ejection fraction was estimated to be 60 %  There were no regional wall motion abnormalities  Wall thickness was normal      MITRAL VALVE:   There was trace regurgitation  TRICUSPID VALVE:   There was trace regurgitation  HISTORY: PRIOR HISTORY: Patient has no history of cardiovascular disease  PROCEDURE: The study was performed in the [address]  This was a routine study  The transthoracic approach was used  The study included complete 2D imaging, M-mode, complete spectral Doppler, and color Doppler  Image   quality was adequate  LEFT VENTRICLE: Size was normal  Systolic function was normal  Ejection fraction was estimated to be 60 %  There were no regional wall motion abnormalities   Wall thickness was normal  RIGHT VENTRICLE: The size was normal  Systolic function was normal  Wall thickness was normal      LEFT ATRIUM: Size was normal      RIGHT ATRIUM: Size was normal      MITRAL VALVE: Valve structure was normal  There was normal leaflet separation  DOPPLER: The transmitral velocity was within the normal range  There was no evidence for stenosis  There was trace regurgitation  AORTIC VALVE: The valve was trileaflet  Leaflets exhibited normal thickness and normal cuspal separation  DOPPLER: Transaortic velocity was within the normal range  There was no evidence for stenosis  There was no regurgitation  TRICUSPID VALVE: The valve structure was normal  There was normal leaflet separation  DOPPLER: The transtricuspid velocity was within the normal range  There was no evidence for stenosis  There was trace regurgitation  PULMONIC VALVE: Leaflets exhibited normal thickness, no calcification, and normal cuspal separation  DOPPLER: The transpulmonic velocity was within the normal range  There was no regurgitation  PERICARDIUM: There was no pericardial effusion  The pericardium was normal in appearance  AORTA: The root exhibited normal size       SYSTEM MEASUREMENT TABLES     2D   %FS: 29 23 %   AV Diam: 3 53 cm   EDV(Teich): 120 4 ml   EF(Cube): 64 55 %   EF(Teich): 55 8 %   ESV(Cube): 45 36 ml   ESV(Teich): 53 21 ml   IVSd: 0 9 cm   LA Area: 17 23 cm2   LA Diam: 3 85 cm   LVEDV MOD A4C: 155 46 ml   LVEF MOD A4C: 53 31 %   LVESV MOD A4C: 72 59 ml   LVIDd: 5 04 cm   LVIDs: 3 57 cm   LVLd A4C: 8 92 cm   LVLs A4C: 7 38 cm   LVPWd: 0 92 cm   RA Area: 19 05 cm2   RV Diam : 3 79 cm   SV MOD A4C: 82 87 ml   SV(Cube): 82 59 ml   SV(Teich): 67 18 ml     MM   TAPSE: 3 45 cm     PW   AVC: 361 89 ms   E': 0 08 m/s   E/E': 9 74   MV A James: 0 74 m/s   MV Dec [place]: 4 17 m/s2   MV DecT: 193 53 ms   MV E James: 0 81 m/s   MV E/A Ratio: 1 08     [employer] Accredited Echocardiography Laboratory     Prepared and electronically signed by     Camille Farrell MD   Signed 18-Aug-2017 13:35:53       Plan  Binge eating disorder    · Vyvanse 30 MG Oral Capsule; TAKE 1 CAPSULE DAILY IN THE MORNING

## 2018-01-17 NOTE — RESULT NOTES
Verified Results  (1) TSH WITH FT4 REFLEX 23Jun2017 06:52AM ION Signature   TW Order Number: [id number]     Test Name Result Flag Reference   TSH 4 170 uIU/mL H 0 358-3 740   Patients undergoing fluorescein dye angiography may retain small amounts of fluorescein in the body for 48-72 hours post procedure  Samples containing fluorescein can produce falsely depressed TSH values  If the patient had this procedure,a specimen should be resubmitted post fluorescein clearance  T4,FREE 1 09 ng/dL  0 76-1 46     (1) HEPATIC FUNCTION PANEL [id number] 06:52AM ION Signature     Test Name Result Flag Reference   ALBUMIN 3 6 g/dL  3 5-5 0   This bloodwork is non-fasting  Please drink two glasses of water morning of  bloodwork     ALK PHOSPHATAS 102 U/L     ALT (SGPT) 45 U/L  12-78   AST(SGOT) 28 U/L  5-45   BILI, DIRECT 0 21 mg/dL H 0 00-0 20   BILI, TOTAL 0 61 mg/dL  0 20-1 00   TOTAL PROTEIN 7 3 g/dL  6 4-8 2     (1) INSULIN, FASTING [id number] 06:52AM ION Signature     Test Name Result Flag Reference   INSULIN, FASTING 14 4 mU/L  3 0-25 0

## 2018-01-22 VITALS
TEMPERATURE: 97.8 F | HEART RATE: 76 BPM | WEIGHT: 259.19 LBS | SYSTOLIC BLOOD PRESSURE: 132 MMHG | BODY MASS INDEX: 38.39 KG/M2 | DIASTOLIC BLOOD PRESSURE: 72 MMHG | HEIGHT: 69 IN

## 2018-01-22 VITALS
HEIGHT: 69 IN | BODY MASS INDEX: 33.47 KG/M2 | RESPIRATION RATE: 18 BRPM | SYSTOLIC BLOOD PRESSURE: 116 MMHG | HEART RATE: 72 BPM | WEIGHT: 226 LBS | TEMPERATURE: 97.6 F | DIASTOLIC BLOOD PRESSURE: 70 MMHG

## 2018-01-22 VITALS
DIASTOLIC BLOOD PRESSURE: 88 MMHG | BODY MASS INDEX: 32.97 KG/M2 | HEIGHT: 69 IN | WEIGHT: 222.6 LBS | SYSTOLIC BLOOD PRESSURE: 118 MMHG | TEMPERATURE: 98.4 F | HEART RATE: 80 BPM

## 2018-01-24 VITALS
SYSTOLIC BLOOD PRESSURE: 112 MMHG | DIASTOLIC BLOOD PRESSURE: 82 MMHG | WEIGHT: 212.13 LBS | BODY MASS INDEX: 31.42 KG/M2 | HEIGHT: 69 IN | HEART RATE: 74 BPM | RESPIRATION RATE: 18 BRPM | TEMPERATURE: 98.3 F

## 2018-01-24 VITALS
RESPIRATION RATE: 14 BRPM | DIASTOLIC BLOOD PRESSURE: 78 MMHG | SYSTOLIC BLOOD PRESSURE: 118 MMHG | WEIGHT: 213 LBS | BODY MASS INDEX: 31.55 KG/M2 | HEIGHT: 69 IN | HEART RATE: 72 BPM | TEMPERATURE: 98.3 F

## 2018-02-26 DIAGNOSIS — F41.9 ANXIETY: Primary | ICD-10-CM

## 2018-02-26 RX ORDER — BUPROPION HYDROCHLORIDE 100 MG/1
1 TABLET, EXTENDED RELEASE ORAL 2 TIMES DAILY
COMMUNITY
Start: 2017-10-26 | End: 2018-02-26 | Stop reason: SDUPTHER

## 2018-02-26 RX ORDER — BUPROPION HYDROCHLORIDE 100 MG/1
100 TABLET, EXTENDED RELEASE ORAL 2 TIMES DAILY
Qty: 60 TABLET | Refills: 3 | Status: SHIPPED | OUTPATIENT
Start: 2018-02-26 | End: 2018-03-15 | Stop reason: SDUPTHER

## 2018-03-15 ENCOUNTER — OFFICE VISIT (OUTPATIENT)
Dept: FAMILY MEDICINE CLINIC | Facility: CLINIC | Age: 48
End: 2018-03-15
Payer: COMMERCIAL

## 2018-03-15 VITALS
BODY MASS INDEX: 31.61 KG/M2 | WEIGHT: 208.6 LBS | HEIGHT: 68 IN | HEART RATE: 98 BPM | RESPIRATION RATE: 16 BRPM | SYSTOLIC BLOOD PRESSURE: 128 MMHG | TEMPERATURE: 68 F | DIASTOLIC BLOOD PRESSURE: 82 MMHG

## 2018-03-15 DIAGNOSIS — E66.9 OBESITY (BMI 30-39.9): Primary | ICD-10-CM

## 2018-03-15 DIAGNOSIS — F50.81 BINGE EATING DISORDER: ICD-10-CM

## 2018-03-15 DIAGNOSIS — F41.9 ANXIETY: ICD-10-CM

## 2018-03-15 PROCEDURE — 3008F BODY MASS INDEX DOCD: CPT | Performed by: FAMILY MEDICINE

## 2018-03-15 PROCEDURE — 99213 OFFICE O/P EST LOW 20 MIN: CPT | Performed by: FAMILY MEDICINE

## 2018-03-15 PROCEDURE — 1036F TOBACCO NON-USER: CPT | Performed by: FAMILY MEDICINE

## 2018-03-15 RX ORDER — CALCIPOTRIENE 0.05 MG/ML
SOLUTION TOPICAL DAILY
COMMUNITY
Start: 2017-02-20

## 2018-03-15 RX ORDER — BUPROPION HYDROCHLORIDE 100 MG/1
100 TABLET, EXTENDED RELEASE ORAL 2 TIMES DAILY
Qty: 60 TABLET | Refills: 1 | Status: SHIPPED | OUTPATIENT
Start: 2018-03-15 | End: 2018-06-05 | Stop reason: SDUPTHER

## 2018-03-15 NOTE — PROGRESS NOTES
Brionna Francois 1970 male MRN: [id number]    Family Medicine Follow-up Visit    ASSESSMENT/PLAN  57 yo M with the followin  Obesity (BMI 30-39 9) - Set weight goal of 195 lbs at f/u in 2 months  Advised on weighs to maintain healthy lifestyle while travelling  2  Binge eating disorder - Stable  Will continue Vyvanse for now, until weight has stabilized around 180 lbs or until weight plateaus due to muscle building   -     lisdexamfetamine (VYVANSE) 50 MG capsule; Take 1 capsule (50 mg total) by mouth every morning Earliest Fill Date: 3/15/18 Max Daily Amount: 50 mg    3  Anxiety - Well controlled with Wellbutrin, which can also improve #1  Medication renewed  Has good support system  No SI    -     buPROPion (WELLBUTRIN SR) 100 mg 12 hr tablet; Take 1 tablet (100 mg total) by mouth 2 (two) times a day    4  RTC in 2 months  No future appointments  SUBJECTIVE  CC: Follow-up    HPI:  Brionna Francois is a 56 y o  male who presents for Follow-up of obesity and anxiety  Patient reports doing well without any acute concerns  Has started traveling for work where he goes to [facility] or [facility]  During the strips, he reports not eating as healthy as he does at home  Often times this is necessary for the work that he does  Today he is returning from [facility] on a 24 hour trip  Reports that his anxiety is somewhat stable outside of the normal stressors at home  Has lost 4 lb (212 to 208) over the past 2 months  The rate of weight loss is relatively slow lower than in 2017  He attributes this to his work schedule requiring him to travel almost every week  Continues to maintain a healthy diet while at home and tries to exercise daily for about 30-45 minutes on the treadmill  Review of Systems   Constitutional: Negative for chills, fatigue and fever  HENT: Negative for ear discharge, ear pain, sinus pain and sore throat  Eyes: Negative for pain, redness and visual disturbance     Respiratory: Negative for cough, chest tightness, shortness of breath and wheezing  Cardiovascular: Negative for chest pain, palpitations and leg swelling  Gastrointestinal: Negative for abdominal distention, abdominal pain, constipation, diarrhea, nausea and vomiting  Genitourinary: Negative for dysuria  Musculoskeletal: Negative for arthralgias, back pain and gait problem  Skin: Negative for rash and wound  Neurological: Negative for dizziness, syncope, speech difficulty, light-headedness, numbness and headaches  Psychiatric/Behavioral: Negative for confusion, dysphoric mood, hallucinations, sleep disturbance and suicidal ideas  The patient is nervous/anxious  Historical Information   The patient history was reviewed as follows:    Past Medical History:   Diagnosis Date    Severe obesity (BMI 35 0-39 9) ([facility] [address] )     resolved 11/22/2017    Subclinical hypothyroidism     resolved 11/22/2017     History reviewed  No pertinent surgical history  Family History   Problem Relation Age of Onset    Psoriasis Mother     Hypertension Father     ADD / ADHD Son     ADD / ADHD Other       Social History   History   Alcohol Use    Yes     Comment: social     History   Drug Use No     History   Smoking Status    Never Smoker   Smokeless Tobacco    Never Used       Medications:     Current Outpatient Prescriptions:     buPROPion (WELLBUTRIN SR) 100 mg 12 hr tablet, Take 1 tablet (100 mg total) by mouth 2 (two) times a day, Disp: 60 tablet, Rfl: 3    calcipotriene (DOVONEX) 0 005 % ointment, Apply topically daily, Disp: , Rfl:     lisdexamfetamine (VYVANSE) 50 MG capsule, Take 1 capsule by mouth Daily, Disp: , Rfl:   No Known Allergies    OBJECTIVE    Vitals:   Vitals:    03/15/18 1309   BP: 128/82   Pulse: 98   Resp: 16   Temp: (!) 68 °F (20 °C)   Weight: 94 6 kg (208 lb 9 6 oz)   Height: 5' 8 3" (1 735 m)       Physical Exam   Constitutional: He is oriented to person, place, and time   He appears well-developed and well-nourished  No distress  HENT:   Head: Normocephalic and atraumatic  Right Ear: External ear normal    Left Ear: External ear normal    Mouth/Throat: No oropharyngeal exudate  Eyes: Conjunctivae and EOM are normal  Pupils are equal, round, and reactive to light  Right eye exhibits no discharge  Left eye exhibits no discharge  No scleral icterus  Neck: Normal range of motion  No JVD present  No tracheal deviation present  Cardiovascular: Normal rate, regular rhythm and normal heart sounds  Exam reveals no friction rub  No murmur heard  Pulmonary/Chest: Effort normal  No respiratory distress  He has no wheezes  He has no rales  Musculoskeletal: Normal range of motion  He exhibits no edema, tenderness or deformity  Lymphadenopathy:     He has no cervical adenopathy  Neurological: He is alert and oriented to person, place, and time  No cranial nerve deficit  Skin: Skin is warm  No rash noted  He is not diaphoretic  No erythema  Psychiatric: He has a normal mood and affect   His behavior is normal  Judgment and thought content normal

## 2018-04-07 DIAGNOSIS — L40.9 PSORIASIS: Primary | ICD-10-CM

## 2018-04-10 RX ORDER — CLOBETASOL PROPIONATE 0.5 MG/G
OINTMENT TOPICAL
Qty: 60 G | Refills: 2 | Status: SHIPPED | OUTPATIENT
Start: 2018-04-10 | End: 2018-07-20 | Stop reason: SDUPTHER

## 2018-04-23 DIAGNOSIS — F50.81 BINGE EATING DISORDER: ICD-10-CM

## 2018-04-24 NOTE — TELEPHONE ENCOUNTER
Pt called back said he was out of his Vyvanse 50mg, taken last one yesterday , will script be ready today?  Please call pt when ready

## 2018-04-24 NOTE — TELEPHONE ENCOUNTER
Pt calling for Vyvanse refill last written on 3/15/18 and filled on 3/23/18  Please advise preceptor to fill if appropriate   Thank you

## 2018-04-27 NOTE — TELEPHONE ENCOUNTER
Patient was advised prescription is at pharmacy, Prior auth submitted to [facility] Rx

## 2018-04-27 NOTE — TELEPHONE ENCOUNTER
Pt called again, he needs RX today he asked if he can  script ASAP  He was told insurance didn't approve it   He gave his RX plan as [facility] [id number], RX PCN IRX, [id number] [facility] ID [id number] [facility] [id number]    He is going out of [facility] Monday 4/30

## 2018-06-05 DIAGNOSIS — F41.9 ANXIETY: ICD-10-CM

## 2018-06-05 NOTE — TELEPHONE ENCOUNTER
Pt calling for refill of bupropion   He will also need a fu visit with Dr Toney Yanes this month if possible

## 2018-06-10 RX ORDER — BUPROPION HYDROCHLORIDE 100 MG/1
100 TABLET, EXTENDED RELEASE ORAL 2 TIMES DAILY
Qty: 60 TABLET | Refills: 0 | Status: SHIPPED | OUTPATIENT
Start: 2018-06-10 | End: 2018-07-29 | Stop reason: SDUPTHER

## 2018-06-11 ENCOUNTER — TELEPHONE (OUTPATIENT)
Dept: FAMILY MEDICINE CLINIC | Facility: CLINIC | Age: 48
End: 2018-06-11

## 2018-06-12 NOTE — TELEPHONE ENCOUNTER
Hi, he needs to come in for f/u of weight loss  I will not prescribe Vyvanse until them, because we've set certain goals as to when we plan to stop the medication  This requires that we follow his weigh  Thanks   -C

## 2018-06-13 ENCOUNTER — TELEPHONE (OUTPATIENT)
Dept: FAMILY MEDICINE CLINIC | Facility: CLINIC | Age: 48
End: 2018-06-13

## 2018-07-20 DIAGNOSIS — F41.9 ANXIETY: ICD-10-CM

## 2018-07-20 DIAGNOSIS — L40.9 PSORIASIS: ICD-10-CM

## 2018-07-20 RX ORDER — BUPROPION HYDROCHLORIDE 100 MG/1
TABLET, EXTENDED RELEASE ORAL
Qty: 60 TABLET | Refills: 0 | Status: CANCELLED | OUTPATIENT
Start: 2018-07-20

## 2018-07-23 ENCOUNTER — TELEPHONE (OUTPATIENT)
Dept: FAMILY MEDICINE CLINIC | Facility: CLINIC | Age: 48
End: 2018-07-23

## 2018-07-23 DIAGNOSIS — F41.9 ANXIETY: ICD-10-CM

## 2018-07-27 NOTE — TELEPHONE ENCOUNTER
After the 3rd attempt for refills patient states he's leaving out of the country and was hoping to resolve these requested meds before the end of the day  He's been with out meds for a week and now experiencing withdraws

## 2018-07-29 RX ORDER — BUPROPION HYDROCHLORIDE 100 MG/1
100 TABLET, EXTENDED RELEASE ORAL 2 TIMES DAILY
Qty: 60 TABLET | Refills: 0 | Status: SHIPPED | OUTPATIENT
Start: 2018-07-29 | End: 2018-09-12 | Stop reason: SDUPTHER

## 2018-07-29 NOTE — TELEPHONE ENCOUNTER
Sent in a 1 month refill, but pt needs to schedule a f/u, he was due for f/u with Dr Maggie Martinez in May    Thank you

## 2018-07-31 RX ORDER — CLOBETASOL PROPIONATE 0.5 MG/G
OINTMENT TOPICAL
Qty: 60 G | Refills: 2 | Status: SHIPPED | OUTPATIENT
Start: 2018-07-31 | End: 2018-11-24 | Stop reason: SDUPTHER

## 2018-07-31 NOTE — TELEPHONE ENCOUNTER
The medication linked to this request is clobetasol for his psoriasis  This has been refilled  Looks like there have been prior requests for Vyvanse that have been denied as patient needs to come in for office visit and review weight as per Dr Aurelio Marion of care and response to requests in June as well

## 2018-09-12 DIAGNOSIS — F41.9 ANXIETY: ICD-10-CM

## 2018-09-13 RX ORDER — BUPROPION HYDROCHLORIDE 100 MG/1
TABLET, EXTENDED RELEASE ORAL
Qty: 60 TABLET | Refills: 0 | Status: SHIPPED | OUTPATIENT
Start: 2018-09-13 | End: 2019-04-06 | Stop reason: SDUPTHER

## 2018-09-13 NOTE — TELEPHONE ENCOUNTER
Only 1 month, pt was supposed to f/u in May, was told to schedule a f/u in July, but was out of the country at that time  Please have him schedule a f/u within the next 2 months,thanks!

## 2018-11-24 DIAGNOSIS — L40.9 PSORIASIS: ICD-10-CM

## 2018-11-25 RX ORDER — CLOBETASOL PROPIONATE 0.5 MG/G
OINTMENT TOPICAL
Qty: 60 G | Refills: 2 | Status: SHIPPED | OUTPATIENT
Start: 2018-11-25 | End: 2020-02-09

## 2018-12-15 DIAGNOSIS — F41.9 ANXIETY: ICD-10-CM

## 2018-12-17 RX ORDER — BUPROPION HYDROCHLORIDE 100 MG/1
TABLET, EXTENDED RELEASE ORAL
Qty: 60 TABLET | Refills: 0 | Status: SHIPPED | OUTPATIENT
Start: 2018-12-17 | End: 2019-01-29 | Stop reason: SDUPTHER

## 2018-12-17 NOTE — TELEPHONE ENCOUNTER
Rx for one month refilled  Pt was supposed to f/u in May, has not followed up since then in spite of office visit requests  I gave a gap refill back in Sept and requested a 1-2 month follow up  He did not follow up  He's not known to me  Please call patient to make an appointment for January 2019 since I'm off this week  Thank you!   Ney

## 2019-01-29 DIAGNOSIS — F41.9 ANXIETY: ICD-10-CM

## 2019-01-31 RX ORDER — BUPROPION HYDROCHLORIDE 100 MG/1
TABLET, EXTENDED RELEASE ORAL
Qty: 60 TABLET | Refills: 0 | Status: SHIPPED | OUTPATIENT
Start: 2019-01-31 | End: 2019-03-09 | Stop reason: SDUPTHER

## 2019-01-31 NOTE — TELEPHONE ENCOUNTER
Rx sent in  Please have patient come in for an office visit  He was supposed to RTC in 2 month nearly a year ago  Patient not known to me, please schedule for long visit  Thank you!   Ney

## 2019-03-09 DIAGNOSIS — F41.9 ANXIETY: ICD-10-CM

## 2019-03-09 RX ORDER — BUPROPION HYDROCHLORIDE 100 MG/1
TABLET, EXTENDED RELEASE ORAL
Qty: 60 TABLET | Refills: 0 | Status: SHIPPED | OUTPATIENT
Start: 2019-03-09 | End: 2019-04-03 | Stop reason: SDUPTHER

## 2019-04-03 ENCOUNTER — OFFICE VISIT (OUTPATIENT)
Dept: FAMILY MEDICINE CLINIC | Facility: CLINIC | Age: 49
End: 2019-04-03

## 2019-04-03 VITALS
WEIGHT: 268.2 LBS | HEART RATE: 78 BPM | RESPIRATION RATE: 16 BRPM | BODY MASS INDEX: 39.72 KG/M2 | DIASTOLIC BLOOD PRESSURE: 100 MMHG | SYSTOLIC BLOOD PRESSURE: 140 MMHG | HEIGHT: 69 IN | TEMPERATURE: 98.2 F

## 2019-04-03 DIAGNOSIS — F41.9 ANXIETY: Primary | ICD-10-CM

## 2019-04-03 DIAGNOSIS — R03.0 ELEVATED BP WITHOUT DIAGNOSIS OF HYPERTENSION: ICD-10-CM

## 2019-04-03 DIAGNOSIS — R63.5 ABNORMAL WEIGHT GAIN: ICD-10-CM

## 2019-04-03 DIAGNOSIS — E66.01 CLASS 3 SEVERE OBESITY DUE TO EXCESS CALORIES WITHOUT SERIOUS COMORBIDITY WITH BODY MASS INDEX (BMI) OF 40.0 TO 44.9 IN ADULT (HCC): ICD-10-CM

## 2019-04-03 PROBLEM — F50.81 BINGE EATING DISORDER: Status: ACTIVE | Noted: 2017-08-04

## 2019-04-03 PROBLEM — E66.9 OBESITY: Status: ACTIVE | Noted: 2017-02-20

## 2019-04-03 PROBLEM — L40.9 PSORIASIS: Status: ACTIVE | Noted: 2017-02-20

## 2019-04-03 PROCEDURE — 3008F BODY MASS INDEX DOCD: CPT | Performed by: FAMILY MEDICINE

## 2019-04-03 PROCEDURE — 99213 OFFICE O/P EST LOW 20 MIN: CPT | Performed by: FAMILY MEDICINE

## 2019-04-03 RX ORDER — BUSPIRONE HYDROCHLORIDE 5 MG/1
5 TABLET ORAL 2 TIMES DAILY
Qty: 60 TABLET | Refills: 0 | Status: SHIPPED | OUTPATIENT
Start: 2019-04-03 | End: 2019-05-20 | Stop reason: SDUPTHER

## 2019-04-06 DIAGNOSIS — F41.9 ANXIETY: ICD-10-CM

## 2019-04-06 RX ORDER — BUPROPION HYDROCHLORIDE 100 MG/1
TABLET, EXTENDED RELEASE ORAL
Qty: 120 TABLET | Refills: 2 | OUTPATIENT
Start: 2019-04-06

## 2019-04-06 RX ORDER — BUPROPION HYDROCHLORIDE 100 MG/1
100 TABLET, EXTENDED RELEASE ORAL 2 TIMES DAILY
Qty: 120 TABLET | Refills: 2 | Status: SHIPPED | OUTPATIENT
Start: 2019-04-06

## 2019-04-13 DIAGNOSIS — F41.9 ANXIETY: ICD-10-CM

## 2019-04-13 RX ORDER — BUPROPION HYDROCHLORIDE 100 MG/1
TABLET, EXTENDED RELEASE ORAL
Qty: 60 TABLET | Refills: 0 | OUTPATIENT
Start: 2019-04-13

## 2019-05-20 DIAGNOSIS — F41.9 ANXIETY: ICD-10-CM

## 2019-05-20 RX ORDER — BUSPIRONE HYDROCHLORIDE 5 MG/1
TABLET ORAL
Qty: 60 TABLET | Refills: 0 | Status: SHIPPED | OUTPATIENT
Start: 2019-05-20

## 2020-02-07 DIAGNOSIS — L40.9 PSORIASIS: ICD-10-CM

## 2020-02-09 RX ORDER — CLOBETASOL PROPIONATE 0.5 MG/G
OINTMENT TOPICAL
Qty: 60 G | Refills: 2 | Status: SHIPPED | OUTPATIENT
Start: 2020-02-09

## 2022-04-04 ENCOUNTER — RA CDI HCC (OUTPATIENT)
Dept: OTHER | Facility: HOSPITAL | Age: 52
End: 2022-04-04

## 2022-04-04 NOTE — PROGRESS NOTES
[facility]  coding opportunities       Chart reviewed, no opportunity found: CHART REVIEWED, NO OPPORTUNITY FOUND        Patients Insurance     Medicare Insurance: [employer] [facility] Advantage